# Patient Record
Sex: FEMALE | Race: WHITE | URBAN - METROPOLITAN AREA
[De-identification: names, ages, dates, MRNs, and addresses within clinical notes are randomized per-mention and may not be internally consistent; named-entity substitution may affect disease eponyms.]

---

## 2024-03-30 ENCOUNTER — OFFICE VISIT (OUTPATIENT)
Age: 24
End: 2024-03-30

## 2024-03-30 VITALS
WEIGHT: 184.6 LBS | DIASTOLIC BLOOD PRESSURE: 75 MMHG | RESPIRATION RATE: 18 BRPM | HEART RATE: 58 BPM | BODY MASS INDEX: 27.34 KG/M2 | HEIGHT: 69 IN | OXYGEN SATURATION: 99 % | TEMPERATURE: 97.2 F | SYSTOLIC BLOOD PRESSURE: 111 MMHG

## 2024-03-30 DIAGNOSIS — J30.1 SEASONAL ALLERGIC RHINITIS DUE TO POLLEN: Primary | ICD-10-CM

## 2024-03-30 RX ORDER — LORATADINE PSEUDOEPHEDRINE SULFATE 10; 240 MG/1; MG/1
1 TABLET, EXTENDED RELEASE ORAL DAILY
COMMUNITY
Start: 2024-03-30

## 2024-03-30 NOTE — PROGRESS NOTES
Marleni Hammodns (:  2000) is a 24 y.o. female,New patient, here for evaluation of the following chief complaint(s):  New Patient (Complains of congestion, headache, sneezing and eyes watering.)      ASSESSMENT/PLAN:  Visit Diagnoses and Associated Orders       Seasonal allergic rhinitis due to pollen    -  Primary    loratadine-pseudoephedrine (CLARITIN-D 24 HOUR)  MG per extended release tablet [07087]                      Mild symptoms, VSS, afebrile. Symptoms consistent with seasonal AR. Patient has a history of this and has not restarted her antihistamine therapy. To start claritin D once daily for allergy symptoms with congestion.  May use nasal saline, cool mist humidifier, rest, increased fluid intake for symptomatic relief. To monitor symptoms and follow-up if symptoms worsen or do not improve on current treatment plan. To ED for severe CP, chest tightness, SOB, rapid worsening of symptoms. Patient verbalized understanding, no questions or concerns at this time .        Follow up in PRN days if symptoms persist or if symptoms worsen.    SUBJECTIVE/OBJECTIVE:  HPI  HPI:   24 y.o. female presents with symptoms of nasal congestion, sinus pain across her cheeks, frontal HA, sneezing, watery itchy eyes x 3 days. Rates HA pain as 6.5/10 in severity, aching in nature, does not radiate. Using advil with mild improvement in symptoms. Nothing makes symptoms worse. Friend at school sick with a cold. Denies fever/chills/sweats/body aches, CP, chest tightness, SOB, cough, sore throat, ear pain, N/V/D, abdominal pain, swollen glands, rash.       Vitals:    24 1628   BP: 111/75   Site: Left Upper Arm   Position: Sitting   Cuff Size: Medium Adult   Pulse: 58   Resp: 18   Temp: 97.2 °F (36.2 °C)   SpO2: 99%   Weight: 83.7 kg (184 lb 9.6 oz)   Height: 1.753 m (5' 9\")     No Known Allergies  Social History     Socioeconomic History    Marital status: Single     Spouse name: Not on file    Number of